# Patient Record
Sex: FEMALE | Race: WHITE | NOT HISPANIC OR LATINO | URBAN - METROPOLITAN AREA
[De-identification: names, ages, dates, MRNs, and addresses within clinical notes are randomized per-mention and may not be internally consistent; named-entity substitution may affect disease eponyms.]

---

## 2019-02-24 ENCOUNTER — EMERGENCY (EMERGENCY)
Facility: HOSPITAL | Age: 34
LOS: 1 days | Discharge: HOME | End: 2019-02-24
Admitting: EMERGENCY MEDICINE
Payer: COMMERCIAL

## 2019-02-24 VITALS
RESPIRATION RATE: 18 BRPM | DIASTOLIC BLOOD PRESSURE: 71 MMHG | HEART RATE: 78 BPM | SYSTOLIC BLOOD PRESSURE: 114 MMHG | WEIGHT: 126.99 LBS | OXYGEN SATURATION: 100 % | TEMPERATURE: 98 F

## 2019-02-24 PROCEDURE — 99283 EMERGENCY DEPT VISIT LOW MDM: CPT

## 2019-02-24 RX ORDER — VALACYCLOVIR 500 MG/1
1 TABLET, FILM COATED ORAL
Qty: 21 | Refills: 0 | OUTPATIENT
Start: 2019-02-24 | End: 2019-03-02

## 2019-02-24 NOTE — ED PROVIDER NOTE - OBJECTIVE STATEMENT
32 y/o f presents c/o rash to left ear for the past 2 days.  Pt stating is slightly rough to touch, itching.  Pt reports gets cold sores on lips and this feels like the beginning of a cold sore.  Denies fever, chills, all other ROS negative.

## 2019-02-24 NOTE — ED PROVIDER NOTE - CLINICAL SUMMARY MEDICAL DECISION MAKING FREE TEXT BOX
32 y/o f rash to left ear; concern for shingles, will treat with valtrex, pt educated regarding transmission to young/elderly/pregnant/immunocompromised, to return to ED if sx worsen.

## 2019-02-24 NOTE — ED PROVIDER NOTE - ENMT, MLM
left ear- +slightly raised area with ?small vesicles just above ear lobe at entrance to ear canal, no lesions in canal, TM intact with no erythema or bulging

## 2019-02-24 NOTE — ED ADULT NURSE NOTE - OBJECTIVE STATEMENT
32 y/o female c/o left ear discomfort and rash since Friday, pt denies fever, chills. 34 y/o female c/o left ear discomfort and rash and itchiness since Friday, pt denies fever, chills.

## 2019-02-28 DIAGNOSIS — Z79.899 OTHER LONG TERM (CURRENT) DRUG THERAPY: ICD-10-CM

## 2019-02-28 DIAGNOSIS — H92.02 OTALGIA, LEFT EAR: ICD-10-CM

## 2019-02-28 DIAGNOSIS — R21 RASH AND OTHER NONSPECIFIC SKIN ERUPTION: ICD-10-CM

## 2019-02-28 DIAGNOSIS — L29.9 PRURITUS, UNSPECIFIED: ICD-10-CM

## 2021-12-05 ENCOUNTER — EMERGENCY (EMERGENCY)
Facility: HOSPITAL | Age: 36
LOS: 1 days | Discharge: HOME | End: 2021-12-05
Admitting: EMERGENCY MEDICINE
Payer: COMMERCIAL

## 2021-12-05 VITALS
RESPIRATION RATE: 16 BRPM | WEIGHT: 130.07 LBS | DIASTOLIC BLOOD PRESSURE: 67 MMHG | SYSTOLIC BLOOD PRESSURE: 98 MMHG | TEMPERATURE: 98 F | OXYGEN SATURATION: 100 % | HEIGHT: 67 IN | HEART RATE: 78 BPM

## 2021-12-05 DIAGNOSIS — N89.8 OTHER SPECIFIED NONINFLAMMATORY DISORDERS OF VAGINA: ICD-10-CM

## 2021-12-05 DIAGNOSIS — N72 INFLAMMATORY DISEASE OF CERVIX UTERI: ICD-10-CM

## 2021-12-05 LAB
APPEARANCE UR: CLEAR — SIGNIFICANT CHANGE UP
BACTERIA # UR AUTO: PRESENT /HPF
BILIRUB UR-MCNC: NEGATIVE — SIGNIFICANT CHANGE UP
COLOR SPEC: YELLOW — SIGNIFICANT CHANGE UP
DIFF PNL FLD: NEGATIVE — SIGNIFICANT CHANGE UP
EPI CELLS # UR: ABNORMAL /HPF (ref 0–5)
GLUCOSE UR QL: NEGATIVE — SIGNIFICANT CHANGE UP
KETONES UR-MCNC: NEGATIVE — SIGNIFICANT CHANGE UP
LEUKOCYTE ESTERASE UR-ACNC: ABNORMAL
NITRITE UR-MCNC: NEGATIVE — SIGNIFICANT CHANGE UP
PH UR: 7 — SIGNIFICANT CHANGE UP (ref 5–8)
PROT UR-MCNC: NEGATIVE MG/DL — SIGNIFICANT CHANGE UP
RBC CASTS # UR COMP ASSIST: < 5 /HPF — SIGNIFICANT CHANGE UP
SP GR SPEC: 1.01 — SIGNIFICANT CHANGE UP (ref 1–1.03)
UROBILINOGEN FLD QL: 0.2 E.U./DL — SIGNIFICANT CHANGE UP
WBC UR QL: ABNORMAL /HPF

## 2021-12-05 PROCEDURE — 87491 CHLMYD TRACH DNA AMP PROBE: CPT

## 2021-12-05 PROCEDURE — 87086 URINE CULTURE/COLONY COUNT: CPT

## 2021-12-05 PROCEDURE — 87800 DETECT AGNT MULT DNA DIREC: CPT

## 2021-12-05 PROCEDURE — 87184 SC STD DISK METHOD PER PLATE: CPT

## 2021-12-05 PROCEDURE — 36415 COLL VENOUS BLD VENIPUNCTURE: CPT

## 2021-12-05 PROCEDURE — 99283 EMERGENCY DEPT VISIT LOW MDM: CPT | Mod: 25

## 2021-12-05 PROCEDURE — 81001 URINALYSIS AUTO W/SCOPE: CPT

## 2021-12-05 PROCEDURE — 96372 THER/PROPH/DIAG INJ SC/IM: CPT

## 2021-12-05 PROCEDURE — 87591 N.GONORRHOEAE DNA AMP PROB: CPT

## 2021-12-05 PROCEDURE — 99284 EMERGENCY DEPT VISIT MOD MDM: CPT

## 2021-12-05 RX ORDER — CEFTRIAXONE 500 MG/1
500 INJECTION, POWDER, FOR SOLUTION INTRAMUSCULAR; INTRAVENOUS ONCE
Refills: 0 | Status: COMPLETED | OUTPATIENT
Start: 2021-12-05 | End: 2021-12-05

## 2021-12-05 RX ADMIN — CEFTRIAXONE 500 MILLIGRAM(S): 500 INJECTION, POWDER, FOR SOLUTION INTRAMUSCULAR; INTRAVENOUS at 12:24

## 2021-12-05 RX ADMIN — Medication 100 MILLIGRAM(S): at 12:24

## 2021-12-05 NOTE — ED PROVIDER NOTE - PHYSICAL EXAMINATION
CONSTITUTIONAL: Well-appearing;  in no apparent distress.   HEAD: Normocephalic; atraumatic.   EYES: PERRL; EOM intact; conjunctiva and sclera clear  CARDIOVASCULAR: Normal S1, S2; No audible murmurs. Regular rate and rhythm.   RESPIRATORY: Breathing easily; breath sounds clear and equal bilaterally; no wheezes, rhonchi, or rales.  GI: Soft; non-distended; non-tender; no palpable organomegaly.   : pelvic- off white milky discharge, no foul smell, erythematous inflamed cervix, no CMT   MSK: FROM at all extremities, normal tone   EXT: No cyanosis or edema; N/V intact  SKIN: Normal for age and race; warm; dry; good turgor; no apparent lesions or rash.   NEURO: A & O x 3; face symmetric; grossly unremarkable.   PSYCHOLOGICAL: The patient’s mood and manner are appropriate.

## 2021-12-05 NOTE — ED PROVIDER NOTE - CLINICAL SUMMARY MEDICAL DECISION MAKING FREE TEXT BOX
35 yo f with pmh of UC c/o vaginal pain and vaginal discharge x 5 days. Pt had unprotected intercourse 2 weeks ago with a partner who she was told was tested. Pt states 4 days ago she went to her obgyn who tested her for STDs. They also did a rapid test for BV and yeast and it came back negative. Pt was told to wait for results. Since then she has experienced more pain and worsening discharge. Also reports dysuria. Denies foul odor. Denies fever, chills, itching, abd pain, hematuria, urinary frequency.  pelvic- off white milky discharge, no foul smell, erythematous inflamed cervix, no CMT. Will treat for gonorrhea/chlamydia. Vaginosis and STD panel sent.

## 2021-12-05 NOTE — ED PROVIDER NOTE - NSFOLLOWUPINSTRUCTIONS_ED_ALL_ED_FT
Abstain from intercourse for 2 weeks or until you get a negative test results.      Cervicitis    WHAT YOU NEED TO KNOW:    Cervicitis inflammation of your cervix. Your cervix is at the bottom of your uterus where it opens into your vagina.    Female Reproductive System         DISCHARGE INSTRUCTIONS:    Return to the emergency department if:   •You have bleeding from your vagina that does not stop and it is not time for your period.          Call your doctor or gynecologist if:   •You are spotting blood from your vagina and it is not time for your period.      •You have yellow or green discharge coming from your vagina after you start treatment.      •You have abdominal pain.      •You have a fever.      •You think or know you are pregnant.      •Your symptoms do not go away 2 to 4 weeks after treatment.      •You have questions or concerns about your condition or care.      Medicines:   •Antibiotics help kill bacteria causing cervicitis. Take them as directed.      •Take your medicine as directed. Contact your healthcare provider if you think your medicine is not helping or if you have side effects. Tell him of her if you are allergic to any medicine. Keep a list of the medicines, vitamins, and herbs you take. Include the amounts, and when and why you take them. Bring the list or the pill bottles to follow-up visits. Carry your medicine list with you in case of an emergency.      Activity: You may need to stop having sex until after you finish taking medicine to treat your condition. If you had other procedures to treat your condition, you may need to stop having sex for some time. Ask your healthcare provider or gynecologist when you can have sex or return to your normal activities.    Prevent cervicitis:   •Get treatment before you continue having sex. Your risk for an STI is lower if you have fewer sex partners. If you have an STI, tell all recent sex partners. Tell them to see a healthcare provider for testing and treatment to help stop the spread of infection. Do not have sex with someone who has or is being treated for an STI.      •Use a condom during sex. Use a latex condom every time you have sex. If you are allergic to latex, use a nonlatex condom.      •Do not use products that can cause irritation. Do not douche unless healthcare providers tell you to. Do not use spermicides if they caused symptoms in the past. Use sanitary napkins instead of tampons.      Follow up with your doctor or gynecologist as directed: You may need to return to have your cervix checked or more tests done. Write down your questions so you remember to ask them during your visits.    For more information:   •[employer] Prevention  [address] [place] [address]  Phone: 1-523.196.5728  Web Address: http://www.cdc.gov

## 2021-12-05 NOTE — ED ADULT NURSE NOTE - NSIMPLEMENTINTERV_GEN_ALL_ED
Implemented All Universal Safety Interventions:  [place] to call system. Call bell, personal items and telephone within reach. Instruct patient to call for assistance. Room bathroom lighting operational. Non-slip footwear when patient is off stretcher. Physically safe environment: no spills, clutter or unnecessary equipment. Stretcher in lowest position, wheels locked, appropriate side rails in place.

## 2021-12-05 NOTE — ED PROVIDER NOTE - OBJECTIVE STATEMENT
37 yo f with pmh of UC c/o vaginal pain and vaginal discharge x 5 days. Pt had unprotected intercourse 2 weeks ago with a partner who she was told was tested. Pt states 4 days ago she went to her obgyn who tested her for STDs. They also did a rapid test for BV and yeast and it came back negative. Pt was told to wait for results. Since then she has experienced more pain and worsening discharge. Also reports dysuria. Denies foul odor. Denies fever, chills, itching, abd pain, hematuria, urinary frequency.

## 2021-12-05 NOTE — ED ADULT TRIAGE NOTE - CHIEF COMPLAINT QUOTE
Pt presents co vaginal itching and "cottage cheese" like vaginal discharge and burning with urination. Reports seen by OBGYN and pending BV and STD results. Denies f/c, pelvic pain/ cramping.

## 2021-12-06 LAB
C TRACH RRNA SPEC QL NAA+PROBE: SIGNIFICANT CHANGE UP
CANDIDA AB TITR SER: SIGNIFICANT CHANGE UP
G VAGINALIS DNA SPEC QL NAA+PROBE: SIGNIFICANT CHANGE UP
N GONORRHOEA RRNA SPEC QL NAA+PROBE: SIGNIFICANT CHANGE UP
SPECIMEN SOURCE: SIGNIFICANT CHANGE UP
T VAGINALIS SPEC QL WET PREP: SIGNIFICANT CHANGE UP

## 2021-12-07 LAB
-  CLINDAMYCIN: SIGNIFICANT CHANGE UP
-  ERYTHROMYCIN: SIGNIFICANT CHANGE UP
-  LEVOFLOXACIN: SIGNIFICANT CHANGE UP
-  PENICILLIN: SIGNIFICANT CHANGE UP
-  VANCOMYCIN: SIGNIFICANT CHANGE UP
CULTURE RESULTS: SIGNIFICANT CHANGE UP
METHOD TYPE: SIGNIFICANT CHANGE UP
ORGANISM # SPEC MICROSCOPIC CNT: SIGNIFICANT CHANGE UP
ORGANISM # SPEC MICROSCOPIC CNT: SIGNIFICANT CHANGE UP
SPECIMEN SOURCE: SIGNIFICANT CHANGE UP

## 2021-12-07 RX ORDER — FLUCONAZOLE 150 MG/1
1 TABLET ORAL
Qty: 1 | Refills: 1
Start: 2021-12-07 | End: 2021-12-08

## 2021-12-07 RX ORDER — CEFPODOXIME PROXETIL 100 MG
1 TABLET ORAL
Qty: 14 | Refills: 0
Start: 2021-12-07 | End: 2021-12-13

## 2021-12-07 NOTE — ED POST DISCHARGE NOTE - DETAILS
pt symptomatic, currently on doxycycline for presumed cervicitis, instructed to stop doxy, will give rx for cefpodoxime; has GYN follow up

## 2023-10-18 ENCOUNTER — APPOINTMENT (OUTPATIENT)
Dept: ORTHOPEDIC SURGERY | Facility: CLINIC | Age: 38
End: 2023-10-18

## 2023-10-26 ENCOUNTER — APPOINTMENT (OUTPATIENT)
Dept: ORTHOPEDIC SURGERY | Facility: CLINIC | Age: 38
End: 2023-10-26

## 2024-06-16 NOTE — ED ADULT NURSE NOTE - NSSUHOSCREENINGYN_ED_ALL_ED
Made provider aware glucose & covid test discontinuation signed by this RN in error.     Minerva Locke RN  06/16/24 [id number]     Yes - the patient is able to be screened

## 2024-07-29 NOTE — ED ADULT NURSE NOTE - CAS DISCH TRANSFER METHOD
Health Maintenance   Topic Date Due    Hepatitis B vaccine (1 of 3 - 3-dose series) 11/10/2024 (Originally 2000)    HPV vaccine (1 - 2-dose series) 11/10/2024 (Originally 9/6/2011)    DTaP/Tdap/Td vaccine (1 - Tdap) 11/10/2024 (Originally 9/6/2019)    COVID-19 Vaccine (1) 11/10/2024 (Originally 3/6/2001)    Hepatitis C screen  11/10/2024 (Originally 9/6/2018)    HIV screen  11/10/2024 (Originally 9/6/2015)    Chlamydia/GC screen  11/10/2024 (Originally 9/6/2016)    Pap smear  11/10/2024 (Originally 9/6/2021)    Flu vaccine (1) 08/01/2024    Depression Monitoring  11/10/2024    Meningococcal (ACWY) vaccine  Completed    Hepatitis A vaccine  Aged Out    Hib vaccine  Aged Out    Polio vaccine  Aged Out    Pneumococcal 0-64 years Vaccine  Aged Out    Varicella vaccine  Discontinued             (applicable per patient's age: Cancer Screenings, Depression Screening, Fall Risk Screening, Immunizations)    No results found for: \"LABA1C\", \"AST\", \"ALT\", \"BUN\", \"CR\"   (goal A1C is < 7)   (goal LDL is <100) need 30-50% reduction from baseline     BP Readings from Last 3 Encounters:   11/10/23 124/74   05/05/23 122/70   02/28/23 118/78    (goal /80)      All Future Testing planned in [facility]:  Lab Frequency Next Occurrence       Next Visit Date:  No future appointments.         Patient Active Problem List:     Headache     Cervical pain     Post-traumatic headache     Menorrhagia with regular cycle     Seasonal allergic rhinitis due to pollen     Dysthymia     Nervousness    
Walking
